# Patient Record
Sex: MALE | URBAN - METROPOLITAN AREA
[De-identification: names, ages, dates, MRNs, and addresses within clinical notes are randomized per-mention and may not be internally consistent; named-entity substitution may affect disease eponyms.]

---

## 2021-07-13 ENCOUNTER — OFFICE VISIT (OUTPATIENT)
Dept: URBAN - METROPOLITAN AREA CLINIC 27 | Facility: CLINIC | Age: 61
End: 2021-07-13

## 2021-07-13 PROBLEM — 59621000 ESSENTIAL HYPERTENSION: Status: ACTIVE | Noted: 2021-07-13

## 2021-07-13 PROBLEM — 40739000 DYSPHAGIA: Status: ACTIVE | Noted: 2021-07-13

## 2021-07-13 PROBLEM — 275978004 SCREENING FOR MALIGNANT NEOPLASM OF COLON: Status: ACTIVE | Noted: 2021-07-13

## 2021-07-13 PROBLEM — 238131007 OVERWEIGHT: Status: ACTIVE | Noted: 2021-07-13

## 2021-07-13 PROBLEM — 449681000124101 LONG-TERM CURRENT USE OF ANTIPLATELET DRUG: Status: ACTIVE | Noted: 2021-07-13

## 2021-07-16 ENCOUNTER — OFFICE VISIT (OUTPATIENT)
Dept: URBAN - METROPOLITAN AREA SURGERY CENTER 7 | Facility: SURGERY CENTER | Age: 61
End: 2021-07-16

## 2021-09-13 ENCOUNTER — OFFICE VISIT (OUTPATIENT)
Dept: URBAN - METROPOLITAN AREA SURGERY CENTER 7 | Facility: SURGERY CENTER | Age: 61
End: 2021-09-13

## 2022-04-30 ENCOUNTER — TELEPHONE ENCOUNTER (OUTPATIENT)
Dept: URBAN - METROPOLITAN AREA CLINIC 121 | Facility: CLINIC | Age: 62
End: 2022-04-30

## 2022-04-30 RX ORDER — PANTOPRAZOLE SODIUM 40 MG/1
TAKE 1 TABLET PO BID TABLET, DELAYED RELEASE ORAL
OUTPATIENT
Start: 2021-07-16 | End: 2021-09-03

## 2022-04-30 RX ORDER — PANTOPRAZOLE SODIUM 40 MG/1
TAKE 1 TABLET PO BID TABLET, DELAYED RELEASE ORAL
OUTPATIENT
Start: 2021-07-16

## 2022-04-30 RX ORDER — OMEPRAZOLE 20 MG/1
1 CAPSULE PO BID X 14 DAYS CAPSULE, DELAYED RELEASE ORAL
OUTPATIENT
Start: 2021-07-26 | End: 2021-08-09

## 2022-04-30 RX ORDER — DOXYCYCLINE HYCLATE 100 MG/1
TAKE 1 TABLET WITH DINNER AT NIGHT FOR 14 DAYS TABLET ORAL
OUTPATIENT
Start: 2021-07-26 | End: 2021-08-09

## 2022-04-30 RX ORDER — LEVOFLOXACIN 250 MG
1 TABLET PO QAM X 14 DAYS TABLET ORAL
OUTPATIENT
Start: 2021-07-26 | End: 2021-08-09

## 2022-04-30 RX ORDER — NITAZOXANIDE 500 MG/1
1 TABLET PO BID X 14 DAYS TABLET ORAL
OUTPATIENT
Start: 2021-07-26 | End: 2021-08-09

## 2022-04-30 RX ORDER — PANTOPRAZOLE SODIUM 40 MG/1
TAKE 1 TABLET BY MOUTH TWICE A DAY TABLET, DELAYED RELEASE ORAL
OUTPATIENT
Start: 2021-09-03 | End: 2022-01-01

## 2022-05-01 ENCOUNTER — TELEPHONE ENCOUNTER (OUTPATIENT)
Dept: URBAN - METROPOLITAN AREA CLINIC 121 | Facility: CLINIC | Age: 62
End: 2022-05-01

## 2022-05-01 RX ORDER — PANTOPRAZOLE SODIUM 40 MG/1
1 TABLET BY MOUTH TWICE A DAY TABLET, DELAYED RELEASE ORAL
Status: ACTIVE | COMMUNITY
Start: 2022-01-04 | End: 2022-05-04

## 2022-05-01 RX ORDER — ASPIRIN 81 MG/1
TABLET, FILM COATED ORAL
Status: ACTIVE | COMMUNITY
Start: 2021-07-13

## 2022-05-01 RX ORDER — LISINOPRIL 2.5 MG/1
TABLET ORAL
Status: ACTIVE | COMMUNITY
Start: 2021-07-13

## 2022-07-11 ENCOUNTER — TELEPHONE ENCOUNTER (OUTPATIENT)
Dept: URBAN - METROPOLITAN AREA CLINIC 27 | Facility: CLINIC | Age: 62
End: 2022-07-11

## 2022-07-11 RX ORDER — PANTOPRAZOLE SODIUM 40 MG/1
1 TABLET TABLET, DELAYED RELEASE ORAL TWICE A DAY
Qty: 60 TABLET | Refills: 2 | OUTPATIENT
Start: 2022-07-11

## 2023-03-05 ENCOUNTER — ERX REFILL RESPONSE (OUTPATIENT)
Dept: URBAN - METROPOLITAN AREA CLINIC 27 | Facility: CLINIC | Age: 63
End: 2023-03-05

## 2023-03-05 RX ORDER — PANTOPRAZOLE SODIUM 40 MG/1
TAKE 1 TABLET BY MOUTH TWICE DAILY TABLET, DELAYED RELEASE ORAL
Qty: 60 TABLET | Refills: 5 | OUTPATIENT

## 2023-03-05 RX ORDER — PANTOPRAZOLE SODIUM 40 MG/1
TAKE 1 TABLET BY MOUTH TWICE DAILY TABLET, DELAYED RELEASE ORAL TWICE A DAY
Qty: 60 TABLET | Refills: 0 | OUTPATIENT

## 2023-08-02 ENCOUNTER — CLAIMS CREATED FROM THE CLAIM WINDOW (OUTPATIENT)
Dept: URBAN - METROPOLITAN AREA CLINIC 4 | Facility: CLINIC | Age: 63
End: 2023-08-02
Payer: COMMERCIAL

## 2023-08-02 ENCOUNTER — LAB OUTSIDE AN ENCOUNTER (OUTPATIENT)
Dept: URBAN - METROPOLITAN AREA CLINIC 27 | Facility: CLINIC | Age: 63
End: 2023-08-02

## 2023-08-02 ENCOUNTER — CLAIMS CREATED FROM THE CLAIM WINDOW (OUTPATIENT)
Dept: URBAN - METROPOLITAN AREA SURGERY CENTER 7 | Facility: SURGERY CENTER | Age: 63
End: 2023-08-02
Payer: COMMERCIAL

## 2023-08-02 DIAGNOSIS — D12.2 BENIGN NEOPLASM OF ASCENDING COLON: ICD-10-CM

## 2023-08-02 DIAGNOSIS — Z12.11 COLON CANCER SCREENING: ICD-10-CM

## 2023-08-02 DIAGNOSIS — D12.2 ADENOMA OF ASCENDING COLON: ICD-10-CM

## 2023-08-02 PROCEDURE — 45380 COLONOSCOPY AND BIOPSY: CPT | Performed by: INTERNAL MEDICINE

## 2023-08-02 PROCEDURE — G8907 PT DOC NO EVENTS ON DISCHARG: HCPCS | Performed by: INTERNAL MEDICINE

## 2023-08-02 PROCEDURE — 88305 TISSUE EXAM BY PATHOLOGIST: CPT | Performed by: PATHOLOGY

## 2023-08-02 RX ORDER — ASPIRIN 81 MG/1
TABLET, FILM COATED ORAL
Status: ACTIVE | COMMUNITY
Start: 2021-07-13

## 2023-08-02 RX ORDER — LISINOPRIL 2.5 MG/1
TABLET ORAL
Status: ACTIVE | COMMUNITY
Start: 2021-07-13

## 2023-08-02 RX ORDER — PANTOPRAZOLE SODIUM 40 MG/1
TAKE 1 TABLET BY MOUTH TWICE DAILY TABLET, DELAYED RELEASE ORAL
Qty: 60 TABLET | Refills: 5 | Status: ACTIVE | COMMUNITY

## 2023-08-04 LAB — RESULT:: (no result)

## 2023-10-14 ENCOUNTER — ERX REFILL RESPONSE (OUTPATIENT)
Dept: URBAN - METROPOLITAN AREA CLINIC 27 | Facility: CLINIC | Age: 63
End: 2023-10-14

## 2023-10-14 RX ORDER — PANTOPRAZOLE SODIUM 40 MG/1
TAKE 1 TABLET BY MOUTH TWICE DAILY TABLET, DELAYED RELEASE ORAL
Qty: 60 TABLET | Refills: 5 | OUTPATIENT

## 2024-07-10 ENCOUNTER — ERX REFILL RESPONSE (OUTPATIENT)
Dept: URBAN - METROPOLITAN AREA CLINIC 27 | Facility: CLINIC | Age: 64
End: 2024-07-10

## 2024-07-10 RX ORDER — PANTOPRAZOLE SODIUM 40 MG/1
TAKE 1 TABLET BY MOUTH TWICE DAILY TABLET, DELAYED RELEASE ORAL
Qty: 60 TABLET | Refills: 5 | OUTPATIENT

## 2025-02-10 ENCOUNTER — DASHBOARD ENCOUNTERS (OUTPATIENT)
Age: 65
End: 2025-02-10

## 2025-02-10 ENCOUNTER — OFFICE VISIT (OUTPATIENT)
Dept: URBAN - METROPOLITAN AREA CLINIC 27 | Facility: CLINIC | Age: 65
End: 2025-02-10
Payer: COMMERCIAL

## 2025-02-10 ENCOUNTER — LAB OUTSIDE AN ENCOUNTER (OUTPATIENT)
Dept: URBAN - METROPOLITAN AREA CLINIC 27 | Facility: CLINIC | Age: 65
End: 2025-02-10

## 2025-02-10 ENCOUNTER — TELEPHONE ENCOUNTER (OUTPATIENT)
Dept: URBAN - METROPOLITAN AREA CLINIC 27 | Facility: CLINIC | Age: 65
End: 2025-02-10

## 2025-02-10 VITALS
HEIGHT: 75 IN | BODY MASS INDEX: 30.59 KG/M2 | WEIGHT: 246 LBS | HEART RATE: 61 BPM | DIASTOLIC BLOOD PRESSURE: 82 MMHG | SYSTOLIC BLOOD PRESSURE: 140 MMHG

## 2025-02-10 DIAGNOSIS — I10 HYPERTENSION: ICD-10-CM

## 2025-02-10 DIAGNOSIS — E66.811 OBESITY (BMI 30.0-34.9): ICD-10-CM

## 2025-02-10 DIAGNOSIS — Z86.711 HISTORY OF PULMONARY EMBOLISM: ICD-10-CM

## 2025-02-10 DIAGNOSIS — K21.9 GERD: ICD-10-CM

## 2025-02-10 DIAGNOSIS — K21.00 REFLUX ESOPHAGITIS: ICD-10-CM

## 2025-02-10 DIAGNOSIS — Z79.02 LONG TERM (CURRENT) USE OF ANTITHROMBOTICS/ANTIPLATELETS: ICD-10-CM

## 2025-02-10 DIAGNOSIS — Z86.0101 HISTORY OF ADENOMATOUS POLYP OF COLON: ICD-10-CM

## 2025-02-10 DIAGNOSIS — R13.10 DYSPHAGIA: ICD-10-CM

## 2025-02-10 PROCEDURE — 99204 OFFICE O/P NEW MOD 45 MIN: CPT | Performed by: INTERNAL MEDICINE

## 2025-02-10 RX ORDER — LISINOPRIL 2.5 MG/1
TABLET ORAL
Status: ACTIVE | COMMUNITY
Start: 2021-07-13

## 2025-02-10 RX ORDER — ASPIRIN 81 MG/1
TABLET, FILM COATED ORAL
Status: ON HOLD | COMMUNITY
Start: 2021-07-13

## 2025-02-10 RX ORDER — PANTOPRAZOLE SODIUM 40 MG/1
TAKE 1 TABLET BY MOUTH TWICE DAILY TABLET, DELAYED RELEASE ORAL
Qty: 60 TABLET | Refills: 5 | Status: ACTIVE | COMMUNITY

## 2025-02-10 NOTE — HPI-TODAY'S VISIT:
Patient here with complaints of dysphagia that has been worsening over the past 6 months.  He has intermittent dysphagia to solids (ie steak), and will often need to regurgitate the food bolus.  He is very careful to chew his food well and eat slowly.  He denies any reflux symptoms with pantoprazole 40 mg twice daily.  He has no other GI symptoms currently.  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  He is now on Eliquis.  His last colonoscopy was in 2023; one small adenoma was removed. He underwent last underwent upper endoscopy in 2021 which revealed moderately severe reflux esophagitis with ulceration and erosions in the very distal esophagus and at the GEJ, with mild/moderate luminal narrowing and mucosal friability at this site.  Moderate nonerosive gastritis was also seen.  Gastric biopsies were revealed H. pylori, and Murillo dilation was performed.  He was subsequently treated for the H. pylori; stool H. pylori antigen was negative in 2023. There is no family history of colon cancer or polyps.  He states that recent labs were normal.

## 2025-04-09 ENCOUNTER — OFFICE VISIT (OUTPATIENT)
Dept: URBAN - METROPOLITAN AREA MEDICAL CENTER 8 | Facility: MEDICAL CENTER | Age: 65
End: 2025-04-09
Payer: COMMERCIAL

## 2025-04-09 ENCOUNTER — TELEPHONE ENCOUNTER (OUTPATIENT)
Dept: URBAN - METROPOLITAN AREA CLINIC 27 | Facility: CLINIC | Age: 65
End: 2025-04-09

## 2025-04-09 DIAGNOSIS — K31.89 OTHER DISEASES OF STOMACH AND DUODENUM: ICD-10-CM

## 2025-04-09 DIAGNOSIS — K22.2 ACQUIRED ESOPHAGEAL RING: ICD-10-CM

## 2025-04-09 DIAGNOSIS — K21.00 ALKALINE REFLUX ESOPHAGITIS: ICD-10-CM

## 2025-04-09 PROCEDURE — 43235 EGD DIAGNOSTIC BRUSH WASH: CPT | Performed by: INTERNAL MEDICINE

## 2025-04-09 PROCEDURE — 43450 DILATE ESOPHAGUS 1/MULT PASS: CPT | Performed by: INTERNAL MEDICINE

## 2025-04-09 RX ORDER — LISINOPRIL 2.5 MG/1
TABLET ORAL
Status: ACTIVE | COMMUNITY
Start: 2021-07-13

## 2025-04-09 RX ORDER — ASPIRIN 81 MG/1
TABLET, FILM COATED ORAL
Status: ON HOLD | COMMUNITY
Start: 2021-07-13

## 2025-04-09 RX ORDER — PANTOPRAZOLE SODIUM 40 MG/1
1 TABLET TABLET, DELAYED RELEASE ORAL TWICE A DAY
Qty: 60 | Refills: 3 | OUTPATIENT
Start: 2025-04-09

## 2025-04-09 RX ORDER — PANTOPRAZOLE SODIUM 40 MG/1
TAKE 1 TABLET BY MOUTH TWICE DAILY TABLET, DELAYED RELEASE ORAL
Qty: 60 TABLET | Refills: 5 | Status: ACTIVE | COMMUNITY

## 2025-04-09 RX ORDER — SUCRALFATE 1 G/1
DISSOLVE ONE TABLET IN 15ML WATER TABLET ORAL
Qty: 90 | Refills: 3 | OUTPATIENT
Start: 2025-04-09

## 2025-05-23 ENCOUNTER — OFFICE VISIT (OUTPATIENT)
Dept: URBAN - METROPOLITAN AREA CLINIC 27 | Facility: CLINIC | Age: 65
End: 2025-05-23

## 2025-06-03 ENCOUNTER — LAB OUTSIDE AN ENCOUNTER (OUTPATIENT)
Dept: URBAN - METROPOLITAN AREA CLINIC 27 | Facility: CLINIC | Age: 65
End: 2025-06-03

## 2025-06-03 ENCOUNTER — OFFICE VISIT (OUTPATIENT)
Dept: URBAN - METROPOLITAN AREA CLINIC 27 | Facility: CLINIC | Age: 65
End: 2025-06-03
Payer: COMMERCIAL

## 2025-06-03 DIAGNOSIS — Z79.02 LONG TERM (CURRENT) USE OF ANTITHROMBOTICS/ANTIPLATELETS: ICD-10-CM

## 2025-06-03 DIAGNOSIS — I10 HYPERTENSION: ICD-10-CM

## 2025-06-03 DIAGNOSIS — R13.10 DYSPHAGIA: ICD-10-CM

## 2025-06-03 DIAGNOSIS — Z86.0101 HISTORY OF ADENOMATOUS POLYP OF COLON: ICD-10-CM

## 2025-06-03 DIAGNOSIS — K21.9 GERD: ICD-10-CM

## 2025-06-03 DIAGNOSIS — R06.02 SHORTNESS OF BREATH: ICD-10-CM

## 2025-06-03 DIAGNOSIS — K21.00 REFLUX ESOPHAGITIS: ICD-10-CM

## 2025-06-03 DIAGNOSIS — E66.811 OBESITY (BMI 30.0-34.9): ICD-10-CM

## 2025-06-03 DIAGNOSIS — Z86.711 HISTORY OF PULMONARY EMBOLISM: ICD-10-CM

## 2025-06-03 PROCEDURE — 99214 OFFICE O/P EST MOD 30 MIN: CPT | Performed by: INTERNAL MEDICINE

## 2025-06-03 RX ORDER — ASPIRIN 81 MG/1
TABLET, FILM COATED ORAL
Status: ON HOLD | COMMUNITY
Start: 2021-07-13

## 2025-06-03 RX ORDER — LISINOPRIL 2.5 MG/1
TABLET ORAL
Status: ACTIVE | COMMUNITY
Start: 2021-07-13

## 2025-06-03 RX ORDER — PANTOPRAZOLE SODIUM 40 MG/1
1 TABLET TABLET, DELAYED RELEASE ORAL TWICE A DAY
Qty: 60 | Refills: 3 | Status: ACTIVE | COMMUNITY
Start: 2025-04-09

## 2025-06-03 RX ORDER — SUCRALFATE 1 G/1
DISSOLVE ONE TABLET IN 15ML WATER TABLET ORAL
Qty: 90 | Refills: 3 | Status: ACTIVE | COMMUNITY
Start: 2025-04-09

## 2025-06-03 NOTE — HPI-TODAY'S VISIT:
Patient here for GI follow-up. He underwent upper endoscopy in April which again revealed moderate reflux esophagitis (linear erosions/ulcerations, friable mucosa, luminal narrowing) in the distal esophagus and at the GEJ. A mild stenosis was present at the GEJ. Murillo dilation was performed. He was asked to continue pantoprazole 40 mg twice daily, and a Carafate tablet in water 3 times daily was added. He currently feels well. He denies any reflux symptoms with the pantoprazole and Carafate. He seems to be adherent to an antireflux regimen. His dysphagia has resolved since the esophageal dilation. He is trying to lose weight, and has lost 5 pounds since last visit. He has had persistent shortness of breath and fatigue >6mos. His pulmonologist has apparently released him from their clinic. He has not seen a cardiologist. He is still on Eliquis though apparently may soon be discontinued. He will soon see a Hematologist re: this.  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  He is now on Eliquis.  His last colonoscopy was in 2023; one small adenoma was removed. He underwent upper endoscopy in 2021 which revealed moderately severe reflux esophagitis with ulceration and erosions in the very distal esophagus and at the GEJ, with mild/moderate luminal narrowing and mucosal friability at this site.  Moderate nonerosive gastritis was also seen.  Gastric biopsies were revealed H. pylori, and Murillo dilation was performed.  He was subsequently treated for the H. pylori; stool H. pylori antigen was negative in 2023. There is no family history of colon cancer or polyps.  He states that recent labs were normal.

## 2025-08-31 ENCOUNTER — ERX REFILL RESPONSE (OUTPATIENT)
Dept: URBAN - METROPOLITAN AREA CLINIC 27 | Facility: CLINIC | Age: 65
End: 2025-08-31

## 2025-08-31 RX ORDER — PANTOPRAZOLE SODIUM 40 MG/1
TAKE 1 TABLET BY MOUTH TWICE DAILY TABLET, DELAYED RELEASE ORAL
Qty: 60 TABLET | Refills: 5 | OUTPATIENT

## 2025-08-31 RX ORDER — PANTOPRAZOLE SODIUM 40 MG/1
1 TABLET TABLET, DELAYED RELEASE ORAL TWICE A DAY
Qty: 60 | Refills: 3 | OUTPATIENT